# Patient Record
Sex: MALE | Race: WHITE | NOT HISPANIC OR LATINO | Employment: FULL TIME | URBAN - METROPOLITAN AREA
[De-identification: names, ages, dates, MRNs, and addresses within clinical notes are randomized per-mention and may not be internally consistent; named-entity substitution may affect disease eponyms.]

---

## 2022-07-15 ENCOUNTER — OFFICE VISIT (OUTPATIENT)
Dept: FAMILY MEDICINE | Facility: CLINIC | Age: 37
End: 2022-07-15
Payer: MEDICAID

## 2022-07-15 ENCOUNTER — LAB VISIT (OUTPATIENT)
Dept: LAB | Facility: HOSPITAL | Age: 37
End: 2022-07-15
Attending: NURSE PRACTITIONER
Payer: MEDICAID

## 2022-07-15 VITALS
HEART RATE: 73 BPM | OXYGEN SATURATION: 98 % | SYSTOLIC BLOOD PRESSURE: 126 MMHG | WEIGHT: 224.31 LBS | RESPIRATION RATE: 20 BRPM | DIASTOLIC BLOOD PRESSURE: 88 MMHG | BODY MASS INDEX: 29.73 KG/M2 | HEIGHT: 73 IN

## 2022-07-15 DIAGNOSIS — R53.83 FATIGUE, UNSPECIFIED TYPE: ICD-10-CM

## 2022-07-15 DIAGNOSIS — F41.9 ANXIETY AND DEPRESSION: ICD-10-CM

## 2022-07-15 DIAGNOSIS — Z11.4 SCREENING FOR HIV WITHOUT PRESENCE OF RISK FACTORS: ICD-10-CM

## 2022-07-15 DIAGNOSIS — K21.9 GASTROESOPHAGEAL REFLUX DISEASE, UNSPECIFIED WHETHER ESOPHAGITIS PRESENT: ICD-10-CM

## 2022-07-15 DIAGNOSIS — F32.A ANXIETY AND DEPRESSION: ICD-10-CM

## 2022-07-15 DIAGNOSIS — Z13.220 SCREENING CHOLESTEROL LEVEL: ICD-10-CM

## 2022-07-15 DIAGNOSIS — Z00.00 ROUTINE HEALTH MAINTENANCE: ICD-10-CM

## 2022-07-15 DIAGNOSIS — Z86.69 HISTORY OF OBSTRUCTIVE SLEEP APNEA: ICD-10-CM

## 2022-07-15 DIAGNOSIS — Z00.00 ROUTINE HEALTH MAINTENANCE: Primary | ICD-10-CM

## 2022-07-15 LAB
ALBUMIN SERPL BCP-MCNC: 4.6 G/DL (ref 3.5–5.2)
ALP SERPL-CCNC: 59 U/L (ref 55–135)
ALT SERPL W/O P-5'-P-CCNC: 41 U/L (ref 10–44)
ANION GAP SERPL CALC-SCNC: 11 MMOL/L (ref 8–16)
AST SERPL-CCNC: 24 U/L (ref 10–40)
BASOPHILS # BLD AUTO: 0.06 K/UL (ref 0–0.2)
BASOPHILS NFR BLD: 0.8 % (ref 0–1.9)
BILIRUB SERPL-MCNC: 0.4 MG/DL (ref 0.1–1)
BILIRUB UR QL STRIP: NEGATIVE
BUN SERPL-MCNC: 12 MG/DL (ref 6–20)
CALCIUM SERPL-MCNC: 9.7 MG/DL (ref 8.7–10.5)
CHLORIDE SERPL-SCNC: 101 MMOL/L (ref 95–110)
CHOLEST SERPL-MCNC: 263 MG/DL (ref 120–199)
CHOLEST/HDLC SERPL: 7.7 {RATIO} (ref 2–5)
CLARITY UR: CLEAR
CO2 SERPL-SCNC: 24 MMOL/L (ref 23–29)
COLOR UR: YELLOW
CREAT SERPL-MCNC: 0.9 MG/DL (ref 0.5–1.4)
DIFFERENTIAL METHOD: ABNORMAL
EOSINOPHIL # BLD AUTO: 0.3 K/UL (ref 0–0.5)
EOSINOPHIL NFR BLD: 3.9 % (ref 0–8)
ERYTHROCYTE [DISTWIDTH] IN BLOOD BY AUTOMATED COUNT: 12.8 % (ref 11.5–14.5)
EST. GFR  (AFRICAN AMERICAN): >60 ML/MIN/1.73 M^2
EST. GFR  (NON AFRICAN AMERICAN): >60 ML/MIN/1.73 M^2
GLUCOSE SERPL-MCNC: 104 MG/DL (ref 70–110)
GLUCOSE UR QL STRIP: NEGATIVE
HCT VFR BLD AUTO: 45.4 % (ref 40–54)
HDLC SERPL-MCNC: 34 MG/DL (ref 40–75)
HDLC SERPL: 12.9 % (ref 20–50)
HGB BLD-MCNC: 15.4 G/DL (ref 14–18)
HGB UR QL STRIP: NEGATIVE
IMM GRANULOCYTES # BLD AUTO: 0.01 K/UL (ref 0–0.04)
IMM GRANULOCYTES NFR BLD AUTO: 0.1 % (ref 0–0.5)
KETONES UR QL STRIP: NEGATIVE
LDLC SERPL CALC-MCNC: ABNORMAL MG/DL (ref 63–159)
LEUKOCYTE ESTERASE UR QL STRIP: NEGATIVE
LYMPHOCYTES # BLD AUTO: 2.7 K/UL (ref 1–4.8)
LYMPHOCYTES NFR BLD: 36.5 % (ref 18–48)
MCH RBC QN AUTO: 29.3 PG (ref 27–31)
MCHC RBC AUTO-ENTMCNC: 33.9 G/DL (ref 32–36)
MCV RBC AUTO: 87 FL (ref 82–98)
MONOCYTES # BLD AUTO: 0.9 K/UL (ref 0.3–1)
MONOCYTES NFR BLD: 11.4 % (ref 4–15)
NEUTROPHILS # BLD AUTO: 3.5 K/UL (ref 1.8–7.7)
NEUTROPHILS NFR BLD: 47.3 % (ref 38–73)
NITRITE UR QL STRIP: NEGATIVE
NONHDLC SERPL-MCNC: 229 MG/DL
NRBC BLD-RTO: 0 /100 WBC
PH UR STRIP: 5 [PH] (ref 5–8)
PLATELET # BLD AUTO: 261 K/UL (ref 150–450)
PMV BLD AUTO: 9 FL (ref 9.2–12.9)
POTASSIUM SERPL-SCNC: 3.8 MMOL/L (ref 3.5–5.1)
PROT SERPL-MCNC: 8.1 G/DL (ref 6–8.4)
PROT UR QL STRIP: NEGATIVE
RBC # BLD AUTO: 5.25 M/UL (ref 4.6–6.2)
SODIUM SERPL-SCNC: 136 MMOL/L (ref 136–145)
SP GR UR STRIP: 1.02 (ref 1–1.03)
TRIGL SERPL-MCNC: 792 MG/DL (ref 30–150)
TSH SERPL DL<=0.005 MIU/L-ACNC: 2.57 UIU/ML (ref 0.34–5.6)
URN SPEC COLLECT METH UR: NORMAL
UROBILINOGEN UR STRIP-ACNC: NEGATIVE EU/DL
WBC # BLD AUTO: 7.47 K/UL (ref 3.9–12.7)

## 2022-07-15 PROCEDURE — 3008F PR BODY MASS INDEX (BMI) DOCUMENTED: ICD-10-PCS | Mod: CPTII,,, | Performed by: NURSE PRACTITIONER

## 2022-07-15 PROCEDURE — 99204 PR OFFICE/OUTPT VISIT, NEW, LEVL IV, 45-59 MIN: ICD-10-PCS | Mod: S$PBB,,, | Performed by: NURSE PRACTITIONER

## 2022-07-15 PROCEDURE — 85025 COMPLETE CBC W/AUTO DIFF WBC: CPT | Performed by: NURSE PRACTITIONER

## 2022-07-15 PROCEDURE — 84443 ASSAY THYROID STIM HORMONE: CPT | Performed by: NURSE PRACTITIONER

## 2022-07-15 PROCEDURE — 36415 COLL VENOUS BLD VENIPUNCTURE: CPT | Performed by: NURSE PRACTITIONER

## 2022-07-15 PROCEDURE — 80061 LIPID PANEL: CPT | Performed by: NURSE PRACTITIONER

## 2022-07-15 PROCEDURE — 99205 OFFICE O/P NEW HI 60 MIN: CPT | Performed by: NURSE PRACTITIONER

## 2022-07-15 PROCEDURE — 1159F MED LIST DOCD IN RCRD: CPT | Mod: CPTII,,, | Performed by: NURSE PRACTITIONER

## 2022-07-15 PROCEDURE — 99204 OFFICE O/P NEW MOD 45 MIN: CPT | Mod: S$PBB,,, | Performed by: NURSE PRACTITIONER

## 2022-07-15 PROCEDURE — 1159F PR MEDICATION LIST DOCUMENTED IN MEDICAL RECORD: ICD-10-PCS | Mod: CPTII,,, | Performed by: NURSE PRACTITIONER

## 2022-07-15 PROCEDURE — 3008F BODY MASS INDEX DOCD: CPT | Mod: CPTII,,, | Performed by: NURSE PRACTITIONER

## 2022-07-15 PROCEDURE — 87389 HIV-1 AG W/HIV-1&-2 AB AG IA: CPT | Performed by: NURSE PRACTITIONER

## 2022-07-15 PROCEDURE — 80053 COMPREHEN METABOLIC PANEL: CPT | Performed by: NURSE PRACTITIONER

## 2022-07-15 PROCEDURE — 81003 URINALYSIS AUTO W/O SCOPE: CPT | Performed by: NURSE PRACTITIONER

## 2022-07-15 RX ORDER — FLUOXETINE HYDROCHLORIDE 20 MG/1
20 CAPSULE ORAL DAILY
Qty: 30 CAPSULE | Refills: 1 | Status: SHIPPED | OUTPATIENT
Start: 2022-07-15 | End: 2022-08-08

## 2022-07-15 NOTE — PROGRESS NOTES
SUBJECTIVE:    Patient ID: Tamiko Maher is a 37 y.o. male.    Chief Complaint: Establish Care, GI Problem, and Anxiety    Mr. Maher is a very pleasant 36 YO male here to establish care with me as his PCP. He states he has not been seen by Primary Care provider since 2016. He has a host of issues that he would like to be addressed at some point but we will prioritize the ones of greatest importance to be addressed today. He states for several years he has been experiencing issues with GERD. He states he has issues with reflux so often he has to take 2-3 Tums daily with minimal relief. He also states he has what he considers to be spasms of diaphragm 3 times per week where he sometimes feels it is difficult to breathe. Lastly, he also states he has diarrhea when he is stressed and would like to be evaluated for Irritable bowel syndrome. He has never been diagnosed with this but feels that it is something that needs to be evaluated.     He also states he has had some issues as of late regarding anxiety and depression. He recently had to move back in with his parents and has had some shifts in jobs. He states this has been having more panic attacks as of late and has never been evaluated by mental health professional. He would like referral at this time.     Lastly, he states he has not been getting restful sleep and does snore and has apneic episodes often. He has never been evaluated for sleep apnea and is requesting referral be placed as well due to his strong family history of this.     GI Problem  The primary symptoms include fatigue, nausea and diarrhea. Primary symptoms do not include fever, abdominal pain, vomiting, myalgias, arthralgias or rash. The illness began more than 7 days ago. The onset was gradual.   The fatigue began more than 1 week ago. The fatigue has been worsening since its onset. The fatigue is worsened by nothing.   Nausea began more than 1 week ago. The nausea is associated with  eating. The nausea is exacerbated by food.   The diarrhea began more than 1 week ago. The diarrhea is watery. The diarrhea occurs 2 to 4 times per day.   The illness is also significant for dysphagia and back pain. The illness does not include constipation. Significant associated medical issues include inflammatory bowel disease.   Anxiety  Presents for initial visit. Onset was 1 to 5 years ago. The problem has been gradually worsening. Symptoms include depressed mood, excessive worry, feeling of choking, malaise, nausea, nervous/anxious behavior, panic and restlessness. Patient reports no chest pain, confusion, decreased concentration, dizziness, palpitations, shortness of breath or suicidal ideas. Symptoms occur most days. The severity of symptoms is interfering with daily activities. Nothing aggravates the symptoms. The quality of sleep is poor. Nighttime awakenings: several.     Risk factors include family history and a major life event. His past medical history is significant for anxiety/panic attacks. Past treatments include nothing.     The patient has several other issues that he would like addressed. He will return for follow up at a later date.    Overdue health maintenance discussed.     No visits with results within 6 Month(s) from this visit.   Latest known visit with results is:   Lab Visit on 07/26/2016   Component Date Value Ref Range Status    WBC 07/26/2016 7.30  3.90 - 12.70 K/uL Final    RBC 07/26/2016 5.01  4.60 - 6.20 M/uL Final    Hemoglobin 07/26/2016 15.1  14.0 - 18.0 g/dL Final    Hematocrit 07/26/2016 43.7  40.0 - 54.0 % Final    MCV 07/26/2016 87  82 - 98 fL Final    MCH 07/26/2016 30.0  27.0 - 31.0 pg Final    MCHC 07/26/2016 34.5  32.0 - 36.0 % Final    RDW 07/26/2016 12.6  11.5 - 14.5 % Final    Platelets 07/26/2016 247  150 - 350 K/uL Final    MPV 07/26/2016 7.4 (A) 9.2 - 12.9 fL Final    Gran # (ANC) 07/26/2016 3.6  1.8 - 7.7 K/uL Final    Lymph # 07/26/2016 2.4  1.0 -  4.8 K/uL Final    Mono # 07/26/2016 0.8  0.3 - 1.0 K/uL Final    Eos # 07/26/2016 0.3  0.0 - 0.5 K/uL Final    Baso # 07/26/2016 0.00  0.00 - 0.20 K/uL Final    Gran % 07/26/2016 50.1  38.0 - 73.0 % Final    Lymph % 07/26/2016 33.7  18.0 - 48.0 % Final    Mono % 07/26/2016 11.3  4.0 - 15.0 % Final    Eosinophil % 07/26/2016 4.4  0.0 - 8.0 % Final    Basophil % 07/26/2016 0.5  0.0 - 1.9 % Final    Differential Method 07/26/2016 Automated   Final    Sodium 07/26/2016 140  136 - 145 mmol/L Final    Potassium 07/26/2016 3.9  3.5 - 5.1 mmol/L Final    Chloride 07/26/2016 106  95 - 110 mmol/L Final    CO2 07/26/2016 24  23 - 29 mmol/L Final    Glucose 07/26/2016 106  70 - 110 mg/dL Final    BUN 07/26/2016 8  6 - 20 mg/dL Final    Creatinine 07/26/2016 0.9  0.5 - 1.4 mg/dL Final    Calcium 07/26/2016 9.3  8.7 - 10.5 mg/dL Final    Total Protein 07/26/2016 7.2  6.0 - 8.4 g/dL Final    Albumin 07/26/2016 4.2  3.5 - 5.2 g/dL Final    Total Bilirubin 07/26/2016 0.2  0.1 - 1.0 mg/dL Final    Alkaline Phosphatase 07/26/2016 62  55 - 135 U/L Final    AST 07/26/2016 23  10 - 40 U/L Final    ALT 07/26/2016 39  10 - 44 U/L Final    Anion Gap 07/26/2016 10  8 - 16 mmol/L Final    eGFR if  07/26/2016 >60  >60 mL/min/1.73 m^2 Final    eGFR if non African American 07/26/2016 >60  >60 mL/min/1.73 m^2 Final    Amylase 07/26/2016 59  20 - 110 U/L Final    Lipase 07/26/2016 142 (A) 4 - 60 U/L Final    H Pylori IgG Interp 07/26/2016 Negative  Negative Final    IgA 07/26/2016 191  40 - 350 mg/dL Final    TTG IgA 07/26/2016 4  <20 UNITS Final       Past Medical History:   Diagnosis Date    Elevated ALT measurement     Fatty liver     GERD (gastroesophageal reflux disease)     Sleep apnea      Social History     Socioeconomic History    Marital status: Single   Tobacco Use    Smoking status: Former Smoker     Packs/day: 0.25     Years: 4.00     Pack years: 1.00     Types: Cigarettes     Start  date: 2012     Quit date: 2016     Years since quittin.0    Smokeless tobacco: Never Used     Past Surgical History:   Procedure Laterality Date    COLONOSCOPY  ~    COLONOSCOPY N/A 2016    Procedure: COLONOSCOPY;  Surgeon: Chris Bautista MD;  Location: [facility];  Service: Endoscopy;  Laterality: N/A;    UPPER GASTROINTESTINAL ENDOSCOPY  2010    LA grade c esophagitis, otherwise normal findings; biopsy: mild chronic gastritis, h pylori negative, acute and chronic esophagitits with focal ulcerations, no dysplasia or malignancy    WISDOM TOOTH EXTRACTION       Family History   Problem Relation Age of Onset    Colon polyps Mother     Colon cancer Neg Hx     Crohn's disease Neg Hx     Ulcerative colitis Neg Hx        Review of patient's allergies indicates:   Allergen Reactions    Grass pollen- grass standard Other (See Comments)       Current Outpatient Medications:     FLUoxetine 20 MG capsule, Take 1 capsule (20 mg total) by mouth once daily., Disp: 30 capsule, Rfl: 1    Review of Systems   Constitutional: Positive for fatigue. Negative for activity change, appetite change, fever and unexpected weight change.   HENT: Negative for congestion, dental problem, hearing loss, nosebleeds, postnasal drip, rhinorrhea, sinus pain, sore throat, tinnitus and trouble swallowing.    Eyes: Negative for pain, discharge, itching and visual disturbance.   Respiratory: Negative for cough, chest tightness, shortness of breath and wheezing.    Cardiovascular: Negative for chest pain and palpitations.   Gastrointestinal: Positive for diarrhea, dysphagia and nausea. Negative for abdominal pain, blood in stool, constipation and vomiting.   Endocrine: Negative for cold intolerance, heat intolerance, polydipsia, polyphagia and polyuria.   Genitourinary: Negative for difficulty urinating, flank pain, genital sores, hematuria and urgency.   Musculoskeletal: Positive for back pain and neck pain.  "Negative for arthralgias, joint swelling and myalgias.   Skin: Negative for rash and wound.   Allergic/Immunologic: Negative for environmental allergies and food allergies.   Neurological: Positive for headaches. Negative for dizziness, weakness and light-headedness.   Hematological: Negative for adenopathy. Does not bruise/bleed easily.   Psychiatric/Behavioral: Negative for behavioral problems, confusion, decreased concentration, dysphoric mood, sleep disturbance and suicidal ideas. The patient is nervous/anxious. The patient is not hyperactive.           Objective:      Vitals:    07/15/22 0902   BP: 126/88   Pulse: 73   Resp: 20   SpO2: 98%   Weight: 101.7 kg (224 lb 4.8 oz)   Height: 6' 1" (1.854 m)     Physical Exam  Vitals reviewed.   Constitutional:       General: He is not in acute distress.     Appearance: Normal appearance. He is normal weight. He is not ill-appearing, toxic-appearing or diaphoretic.   HENT:      Head: Normocephalic and atraumatic.      Right Ear: Tympanic membrane and external ear normal. There is no impacted cerumen.      Left Ear: Tympanic membrane and external ear normal. There is no impacted cerumen.      Nose: Nose normal. No congestion or rhinorrhea.      Mouth/Throat:      Mouth: Mucous membranes are moist.      Pharynx: Oropharynx is clear. No oropharyngeal exudate or posterior oropharyngeal erythema.   Eyes:      General: No scleral icterus.        Right eye: No discharge.         Left eye: No discharge.      Extraocular Movements: Extraocular movements intact.      Conjunctiva/sclera: Conjunctivae normal.      Pupils: Pupils are equal, round, and reactive to light.   Cardiovascular:      Rate and Rhythm: Normal rate and regular rhythm.      Pulses: Normal pulses.      Heart sounds: Normal heart sounds. No murmur heard.    No friction rub. No gallop.   Pulmonary:      Effort: Pulmonary effort is normal. No respiratory distress.      Breath sounds: Normal breath sounds. No " wheezing, rhonchi or rales.   Chest:      Chest wall: No tenderness.   Abdominal:      General: Abdomen is flat. Bowel sounds are normal.      Palpations: Abdomen is soft. There is no mass.      Tenderness: There is no abdominal tenderness. There is no guarding or rebound.   Musculoskeletal:         General: No swelling or signs of injury. Normal range of motion.      Cervical back: Normal range of motion and neck supple. No rigidity or tenderness.      Right lower leg: No edema.      Left lower leg: No edema.   Skin:     General: Skin is warm and dry.      Capillary Refill: Capillary refill takes less than 2 seconds.      Coloration: Skin is not pale.      Findings: No bruising or erythema.   Neurological:      General: No focal deficit present.      Mental Status: He is alert and oriented to person, place, and time. Mental status is at baseline.      Motor: No weakness.      Coordination: Coordination normal.      Gait: Gait normal.   Psychiatric:         Mood and Affect: Mood normal.         Behavior: Behavior normal.         Thought Content: Thought content normal.         Judgment: Judgment normal.           Assessment:       1. Routine health maintenance    2. History of obstructive sleep apnea    3. Anxiety and depression    4. Gastroesophageal reflux disease, unspecified whether esophagitis present    5. Fatigue, unspecified type    6. Screening cholesterol level    7. Screening for HIV without presence of risk factors         Plan:       Routine health maintenance  -     Lipid Panel; Future; Expected date: 07/15/2022  -     Comprehensive Metabolic Panel; Future; Expected date: 07/15/2022  -     CBC auto differential; Future; Expected date: 07/15/2022  -     TSH; Future; Expected date: 07/15/2022  -     Urinalysis; Future; Expected date: 07/15/2022    History of obstructive sleep apnea  -     Ambulatory referral/consult to Pulmonology; Future; Expected date: 07/22/2022    Anxiety and depression  -      Ambulatory referral/consult to Psychiatry; Future; Expected date: 07/22/2022  -     FLUoxetine 20 MG capsule; Take 1 capsule (20 mg total) by mouth once daily.  Dispense: 30 capsule; Refill: 1    Gastroesophageal reflux disease, unspecified whether esophagitis present  -     CBC auto differential; Future; Expected date: 07/15/2022  -     Ambulatory referral/consult to Gastroenterology; Future; Expected date: 07/21/2022    Fatigue, unspecified type  -     TSH; Future; Expected date: 07/15/2022    Screening cholesterol level  -     Lipid Panel; Future; Expected date: 07/15/2022    Screening for HIV without presence of risk factors  -     HIV 1/2 Ag/Ab (4th Gen); Future; Expected date: 07/15/2022      Follow up in about 4 weeks (around 8/12/2022), or if symptoms worsen or fail to improve, for Lab review/Back pain.        7/15/2022 STEVAN Miranda, FNP-C

## 2022-07-16 LAB — HIV 1+2 AB+HIV1 P24 AG SERPL QL IA: NON REACTIVE

## 2022-07-18 ENCOUNTER — TELEPHONE (OUTPATIENT)
Dept: FAMILY MEDICINE | Facility: CLINIC | Age: 37
End: 2022-07-18

## 2022-07-18 NOTE — TELEPHONE ENCOUNTER
----- Message from BELLO Renae-OSCAR sent at 7/18/2022 10:23 AM CDT -----  Mr. Maher your lipid panel  is extremely elevated and the best course of action at this time would be to start a statin medication daily to lower cholesterol level. I would also like to suggest lifestyle changes such as a healthier, heart healthy diet. The medication would be taken once daily but it is imperative to take to lower the risk of heart attack or stroke due to elevated cholesterol levels.    The rest of your labs were within normal range. Please let me know if you have any questions. I will send medication once I know he is amendable to receive treatment.

## 2022-07-18 NOTE — PROGRESS NOTES
Mr. Maher your lipid panel  is extremely elevated and the best course of action at this time would be to start a statin medication daily to lower cholesterol level. I would also like to suggest lifestyle changes such as a healthier, heart healthy diet. The medication would be taken once daily but it is imperative to take to lower the risk of heart attack or stroke due to elevated cholesterol levels.    The rest of your labs were within normal range. Please let me know if you have any questions. I will send medication once I know he is amendable to receive treatment.

## 2022-07-19 ENCOUNTER — PATIENT MESSAGE (OUTPATIENT)
Dept: FAMILY MEDICINE | Facility: CLINIC | Age: 37
End: 2022-07-19

## 2022-07-19 NOTE — TELEPHONE ENCOUNTER
Patient returned nurse call from earlier. He stated he works at night so it's hard for him to receive calls during the morning due to him being sleep. I tried contacting patient again to give lab results. LVM for patient to sign up for [facility] account

## 2022-07-23 ENCOUNTER — PATIENT MESSAGE (OUTPATIENT)
Dept: FAMILY MEDICINE | Facility: CLINIC | Age: 37
End: 2022-07-23

## 2022-07-29 DIAGNOSIS — E78.2 MIXED HYPERLIPIDEMIA: Primary | ICD-10-CM

## 2022-07-29 RX ORDER — ATORVASTATIN CALCIUM 40 MG/1
40 TABLET, FILM COATED ORAL DAILY
Qty: 90 TABLET | Refills: 3 | Status: SHIPPED | OUTPATIENT
Start: 2022-07-29 | End: 2023-07-29

## 2022-08-11 RX ORDER — AMOXICILLIN AND CLAVULANATE POTASSIUM 875; 125 MG/1; MG/1
1 TABLET, FILM COATED ORAL 2 TIMES DAILY
COMMUNITY
Start: 2022-08-04 | End: 2022-08-19

## 2022-08-19 ENCOUNTER — OFFICE VISIT (OUTPATIENT)
Dept: FAMILY MEDICINE | Facility: CLINIC | Age: 37
End: 2022-08-19
Payer: MEDICAID

## 2022-08-19 VITALS
SYSTOLIC BLOOD PRESSURE: 118 MMHG | RESPIRATION RATE: 16 BRPM | HEIGHT: 73 IN | BODY MASS INDEX: 29.22 KG/M2 | OXYGEN SATURATION: 98 % | TEMPERATURE: 98 F | WEIGHT: 220.5 LBS | DIASTOLIC BLOOD PRESSURE: 74 MMHG | HEART RATE: 87 BPM

## 2022-08-19 DIAGNOSIS — F41.9 ANXIETY AND DEPRESSION: ICD-10-CM

## 2022-08-19 DIAGNOSIS — E78.2 MIXED HYPERLIPIDEMIA: ICD-10-CM

## 2022-08-19 DIAGNOSIS — F32.A ANXIETY AND DEPRESSION: ICD-10-CM

## 2022-08-19 DIAGNOSIS — K21.9 GASTROESOPHAGEAL REFLUX DISEASE, UNSPECIFIED WHETHER ESOPHAGITIS PRESENT: Primary | ICD-10-CM

## 2022-08-19 PROCEDURE — 1159F MED LIST DOCD IN RCRD: CPT | Mod: CPTII,,, | Performed by: NURSE PRACTITIONER

## 2022-08-19 PROCEDURE — 99213 PR OFFICE/OUTPT VISIT, EST, LEVL III, 20-29 MIN: ICD-10-PCS | Mod: S$PBB,,, | Performed by: NURSE PRACTITIONER

## 2022-08-19 PROCEDURE — 3074F SYST BP LT 130 MM HG: CPT | Mod: CPTII,,, | Performed by: NURSE PRACTITIONER

## 2022-08-19 PROCEDURE — 99215 OFFICE O/P EST HI 40 MIN: CPT | Performed by: NURSE PRACTITIONER

## 2022-08-19 PROCEDURE — 3008F BODY MASS INDEX DOCD: CPT | Mod: CPTII,,, | Performed by: NURSE PRACTITIONER

## 2022-08-19 PROCEDURE — 3008F PR BODY MASS INDEX (BMI) DOCUMENTED: ICD-10-PCS | Mod: CPTII,,, | Performed by: NURSE PRACTITIONER

## 2022-08-19 PROCEDURE — 99213 OFFICE O/P EST LOW 20 MIN: CPT | Mod: S$PBB,,, | Performed by: NURSE PRACTITIONER

## 2022-08-19 PROCEDURE — 3078F DIAST BP <80 MM HG: CPT | Mod: CPTII,,, | Performed by: NURSE PRACTITIONER

## 2022-08-19 PROCEDURE — 3078F PR MOST RECENT DIASTOLIC BLOOD PRESSURE < 80 MM HG: ICD-10-PCS | Mod: CPTII,,, | Performed by: NURSE PRACTITIONER

## 2022-08-19 PROCEDURE — 3074F PR MOST RECENT SYSTOLIC BLOOD PRESSURE < 130 MM HG: ICD-10-PCS | Mod: CPTII,,, | Performed by: NURSE PRACTITIONER

## 2022-08-19 PROCEDURE — 1159F PR MEDICATION LIST DOCUMENTED IN MEDICAL RECORD: ICD-10-PCS | Mod: CPTII,,, | Performed by: NURSE PRACTITIONER

## 2022-08-19 NOTE — PROGRESS NOTES
Patient ID: Tamiko Maher is a 37 y.o. male.    Chief Complaint: Follow-up (One month lab review.)    Mr. Maher presents today for follow up. He is doing well at this time and is currently recovering from Covid 19 infection he was diagnosed with 2 weeks ago. He states he finally feels his symptoms are resolving but felt he experienced symptoms longer than average person due to his elevated lipids. His labs were reviewed in great detail during this visit and he was started on medication to lower. He is requesting referrals be resent due to no one reaching out to him. He denies any other issues or complaints.     Past Medical History:   Diagnosis Date    Elevated ALT measurement     Fatty liver     GERD (gastroesophageal reflux disease)     Hyperlipidemia     Sleep apnea      Past Surgical History:   Procedure Laterality Date    COLONOSCOPY  ~2001    COLONOSCOPY N/A 9/1/2016    Procedure: COLONOSCOPY;  Surgeon: Chris Bautista MD;  Location: [facility];  Service: Endoscopy;  Laterality: N/A;    UPPER GASTROINTESTINAL ENDOSCOPY  05/12/2010    LA grade c esophagitis, otherwise normal findings; biopsy: mild chronic gastritis, h pylori negative, acute and chronic esophagitits with focal ulcerations, no dysplasia or malignancy    WISDOM TOOTH EXTRACTION           Tobacco History:  reports that he quit smoking about 6 years ago. His smoking use included cigarettes. He started smoking about 10 years ago. He has never used smokeless tobacco.      Review of patient's allergies indicates:   Allergen Reactions    Grass pollen-luis daniel grass standard Other (See Comments)       Current Outpatient Medications:     atorvastatin (LIPITOR) 40 MG tablet, Take 1 tablet (40 mg total) by mouth once daily., Disp: 90 tablet, Rfl: 3    FLUoxetine 20 MG capsule, TAKE 1 CAPSULE BY MOUTH EVERY DAY, Disp: 30 capsule, Rfl: 1    Review of Systems   Constitutional: Negative for activity change, appetite change, fatigue, fever and  "unexpected weight change.   HENT: Negative for congestion, dental problem, hearing loss, nosebleeds, postnasal drip, rhinorrhea, sinus pain, sore throat, tinnitus and trouble swallowing.    Eyes: Negative for pain, discharge, itching and visual disturbance.   Respiratory: Negative for cough, chest tightness, shortness of breath and wheezing.    Cardiovascular: Negative for chest pain and palpitations.   Gastrointestinal: Negative for abdominal pain, blood in stool, constipation, diarrhea, nausea and vomiting.   Endocrine: Negative for cold intolerance, heat intolerance, polydipsia, polyphagia and polyuria.   Genitourinary: Negative for difficulty urinating, flank pain, genital sores, hematuria and urgency.   Musculoskeletal: Negative for arthralgias, joint swelling, myalgias and neck pain.   Skin: Negative for rash and wound.   Allergic/Immunologic: Negative for environmental allergies and food allergies.   Neurological: Negative for dizziness, weakness, light-headedness and headaches.   Hematological: Negative for adenopathy. Does not bruise/bleed easily.   Psychiatric/Behavioral: Negative for behavioral problems, confusion, decreased concentration, dysphoric mood, sleep disturbance and suicidal ideas. The patient is not nervous/anxious and is not hyperactive.           Objective:      Vitals:    08/19/22 1059   BP: 118/74   Pulse: 87   Resp: 16   Temp: 98.4 °F (36.9 °C)   TempSrc: Oral   SpO2: 98%   Weight: 100 kg (220 lb 8 oz)   Height: 6' 1" (1.854 m)     Physical Exam  Vitals reviewed.   Constitutional:       General: He is not in acute distress.     Appearance: Normal appearance. He is normal weight. He is not ill-appearing, toxic-appearing or diaphoretic.   HENT:      Head: Normocephalic and atraumatic.      Right Ear: Tympanic membrane and external ear normal. There is no impacted cerumen.      Left Ear: Tympanic membrane and external ear normal. There is no impacted cerumen.      Nose: Nose normal. No " congestion or rhinorrhea.      Mouth/Throat:      Mouth: Mucous membranes are moist.      Pharynx: Oropharynx is clear. No oropharyngeal exudate or posterior oropharyngeal erythema.   Eyes:      General: No scleral icterus.        Right eye: No discharge.         Left eye: No discharge.      Extraocular Movements: Extraocular movements intact.      Conjunctiva/sclera: Conjunctivae normal.      Pupils: Pupils are equal, round, and reactive to light.   Cardiovascular:      Rate and Rhythm: Normal rate and regular rhythm.      Pulses: Normal pulses.      Heart sounds: Normal heart sounds. No murmur heard.    No friction rub. No gallop.   Pulmonary:      Effort: Pulmonary effort is normal. No respiratory distress.      Breath sounds: Normal breath sounds. No wheezing, rhonchi or rales.   Chest:      Chest wall: No tenderness.   Abdominal:      General: Abdomen is flat. Bowel sounds are normal.      Palpations: Abdomen is soft. There is no mass.      Tenderness: There is no abdominal tenderness. There is no guarding or rebound.   Musculoskeletal:         General: No swelling or signs of injury. Normal range of motion.      Cervical back: Normal range of motion and neck supple. No rigidity or tenderness.      Right lower leg: No edema.      Left lower leg: No edema.   Skin:     General: Skin is warm and dry.      Capillary Refill: Capillary refill takes less than 2 seconds.      Coloration: Skin is not pale.      Findings: No bruising or erythema.   Neurological:      General: No focal deficit present.      Mental Status: He is alert and oriented to person, place, and time. Mental status is at baseline.      Motor: No weakness.      Coordination: Coordination normal.      Gait: Gait normal.   Psychiatric:         Mood and Affect: Mood normal.         Behavior: Behavior normal.         Thought Content: Thought content normal.         Judgment: Judgment normal.           Assessment:       1. Gastroesophageal reflux disease,  unspecified whether esophagitis present    2. Anxiety and depression    3. Mixed hyperlipidemia           Plan:       Gastroesophageal reflux disease, unspecified whether esophagitis present  -     Ambulatory referral/consult to Gastroenterology; Future; Expected date: 08/26/2022    Anxiety and depression  -     Ambulatory referral/consult to Psychiatry; Future; Expected date: 08/26/2022    Mixed hyperlipidemia  -     Lipid Panel; Future; Expected date: 01/19/2023      Follow up in about 5 months (around 1/19/2023), or if symptoms worsen or fail to improve, for Hyperlipidemia/Hypertension .        8/19/2022 Holley Wyatt, NP

## 2022-09-27 ENCOUNTER — OFFICE VISIT (OUTPATIENT)
Dept: PULMONOLOGY | Facility: CLINIC | Age: 37
End: 2022-09-27
Payer: MEDICAID

## 2022-09-27 VITALS
DIASTOLIC BLOOD PRESSURE: 80 MMHG | BODY MASS INDEX: 29.26 KG/M2 | HEART RATE: 66 BPM | OXYGEN SATURATION: 98 % | SYSTOLIC BLOOD PRESSURE: 130 MMHG | WEIGHT: 221.81 LBS

## 2022-09-27 DIAGNOSIS — R09.81 NASAL CONGESTION: ICD-10-CM

## 2022-09-27 DIAGNOSIS — G47.33 OBSTRUCTIVE SLEEP APNEA: ICD-10-CM

## 2022-09-27 PROCEDURE — 1159F PR MEDICATION LIST DOCUMENTED IN MEDICAL RECORD: ICD-10-PCS | Mod: CPTII,S$GLB,, | Performed by: INTERNAL MEDICINE

## 2022-09-27 PROCEDURE — 3008F PR BODY MASS INDEX (BMI) DOCUMENTED: ICD-10-PCS | Mod: CPTII,S$GLB,, | Performed by: INTERNAL MEDICINE

## 2022-09-27 PROCEDURE — 3079F DIAST BP 80-89 MM HG: CPT | Mod: CPTII,S$GLB,, | Performed by: INTERNAL MEDICINE

## 2022-09-27 PROCEDURE — 99204 OFFICE O/P NEW MOD 45 MIN: CPT | Mod: S$GLB,,, | Performed by: INTERNAL MEDICINE

## 2022-09-27 PROCEDURE — 3008F BODY MASS INDEX DOCD: CPT | Mod: CPTII,S$GLB,, | Performed by: INTERNAL MEDICINE

## 2022-09-27 PROCEDURE — 3075F SYST BP GE 130 - 139MM HG: CPT | Mod: CPTII,S$GLB,, | Performed by: INTERNAL MEDICINE

## 2022-09-27 PROCEDURE — 99204 PR OFFICE/OUTPT VISIT, NEW, LEVL IV, 45-59 MIN: ICD-10-PCS | Mod: S$GLB,,, | Performed by: INTERNAL MEDICINE

## 2022-09-27 PROCEDURE — 3079F PR MOST RECENT DIASTOLIC BLOOD PRESSURE 80-89 MM HG: ICD-10-PCS | Mod: CPTII,S$GLB,, | Performed by: INTERNAL MEDICINE

## 2022-09-27 PROCEDURE — 1159F MED LIST DOCD IN RCRD: CPT | Mod: CPTII,S$GLB,, | Performed by: INTERNAL MEDICINE

## 2022-09-27 PROCEDURE — 3075F PR MOST RECENT SYSTOLIC BLOOD PRESS GE 130-139MM HG: ICD-10-PCS | Mod: CPTII,S$GLB,, | Performed by: INTERNAL MEDICINE

## 2022-09-27 NOTE — PROGRESS NOTES
New Office Visit/Consultation Note *    Patient Name: Tamiko Maher  MRN: [id number]  : 1985      Reason for visit: Sleep apnea    HPI:     2022 - Pt referred for evaluation of sleep apnea.  + snoring, + EDS (ESS 16).  Stop-BANG - 6.    Past Medical History    Past Medical History:   Diagnosis Date    Elevated ALT measurement     Fatty liver     GERD (gastroesophageal reflux disease)     Hyperlipidemia     Sleep apnea        Past Surgical History    Past Surgical History:   Procedure Laterality Date    COLONOSCOPY  ~    COLONOSCOPY N/A 2016    Procedure: COLONOSCOPY;  Surgeon: Chris Bautista MD;  Location: [facility];  Service: Endoscopy;  Laterality: N/A;    UPPER GASTROINTESTINAL ENDOSCOPY  2010    LA grade c esophagitis, otherwise normal findings; biopsy: mild chronic gastritis, h pylori negative, acute and chronic esophagitits with focal ulcerations, no dysplasia or malignancy    WISDOM TOOTH EXTRACTION         Medications      Current Outpatient Medications:     atorvastatin (LIPITOR) 40 MG tablet, Take 1 tablet (40 mg total) by mouth once daily., Disp: 90 tablet, Rfl: 3    FLUoxetine 20 MG capsule, TAKE 1 CAPSULE BY MOUTH EVERY DAY, Disp: 30 capsule, Rfl: 1    Allergies    Review of patient's allergies indicates:   Allergen Reactions    Grass pollen- grass standard Other (See Comments)       SocHx    Social History     Tobacco Use   Smoking Status Former    Types: Cigarettes    Start date: 2012    Quit date: 2016    Years since quittin.2   Smokeless Tobacco Never       Social History     Substance and Sexual Activity   Alcohol Use Never       Drug Use - no, rare MJ use  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 Negative for chills, diaphoresis, fever and weight loss.        + EDS   HENT:  Negative for congestion.    Eyes:  Negative for pain.   Respiratory:  Negative for cough, hemoptysis, sputum production, shortness of breath, wheezing and stridor.    Cardiovascular:  Negative for chest pain, palpitations, orthopnea, claudication, leg swelling and PND.   Gastrointestinal:  Negative for abdominal pain, constipation, diarrhea, heartburn, nausea and vomiting.   Genitourinary:  Negative for dysuria, frequency and urgency.   Musculoskeletal:  Negative for falls and myalgias.   Neurological:  Negative for sensory change, focal weakness and weakness.   Psychiatric/Behavioral:  Negative for depression, substance abuse and suicidal ideas. The patient is not nervous/anxious.      Physical Exam    Vitals:    09/27/22 1322   BP: 130/80   BP Location: Left arm   Patient Position: Sitting   BP Method: Medium (Manual)   Pulse: 66   SpO2: 98%   Weight: 100.6 kg (221 lb 12.8 oz)       Physical Exam  Vitals and nursing note reviewed.   Constitutional:       General: He is not in acute distress.     Appearance: He is well-developed. He is not ill-appearing, toxic-appearing or diaphoretic.   HENT:      Head: Normocephalic and atraumatic.      Right Ear: External ear normal.      Left Ear: External ear normal.      Nose: Nose normal.      Mouth/Throat:      Mouth: Mucous membranes are moist.      Pharynx: Oropharynx is clear. No oropharyngeal exudate or posterior oropharyngeal erythema.      Comments: + Mallampatti II  Eyes:      General: No scleral icterus.        Right eye: No discharge.         Left eye: No discharge.      Extraocular Movements: Extraocular movements intact.      Conjunctiva/sclera: Conjunctivae normal.      Pupils: Pupils are equal, round, and reactive to light.   Neck:      Thyroid: No thyromegaly.      Vascular: No JVD.      Trachea: No tracheal deviation.    Cardiovascular:      Rate and Rhythm: Normal rate and regular rhythm.      Heart sounds: Normal heart sounds. No murmur heard.    No friction rub. No gallop.   Pulmonary:      Effort: Pulmonary effort is normal. No respiratory distress.      Breath sounds: Normal breath sounds. No stridor. No wheezing, rhonchi or rales.   Chest:      Chest wall: No tenderness.   Abdominal:      General: Bowel sounds are normal. There is no distension.      Palpations: Abdomen is soft.      Tenderness: There is no abdominal tenderness. There is no guarding.   Musculoskeletal:         General: No tenderness. Normal range of motion.      Cervical back: Normal range of motion and neck supple. No rigidity or tenderness.      Right lower leg: No edema.      Left lower leg: No edema.   Lymphadenopathy:      Cervical: No cervical adenopathy.   Neurological:      General: No focal deficit present.      Mental Status: He is alert and oriented to person, place, and time. Mental status is at baseline.      Cranial Nerves: No cranial nerve deficit.      Motor: No weakness.      Gait: Gait normal.      Deep Tendon Reflexes: Reflexes are normal and symmetric.   Psychiatric:         Mood and Affect: Mood normal.         Behavior: Behavior normal.         Thought Content: Thought content normal.         Judgment: Judgment normal.       Labs    Lab Results   Component Value Date    WBC 7.47 07/15/2022    HGB 15.4 07/15/2022    HCT 45.4 07/15/2022     07/15/2022       Sodium   Date Value Ref Range Status   07/15/2022 136 136 - 145 mmol/L Final     Potassium   Date Value Ref Range Status   07/15/2022 3.8 3.5 - 5.1 mmol/L Final     Chloride   Date Value Ref Range Status   07/15/2022 101 95 - 110 mmol/L Final     CO2   Date Value Ref Range Status   07/15/2022 24 23 - 29 mmol/L Final     Glucose   Date Value Ref Range Status   07/15/2022 104 70 - 110 mg/dL Final     BUN   Date Value Ref Range Status   07/15/2022 12 6 - 20 mg/dL Final      Creatinine   Date Value Ref Range Status   07/15/2022 0.9 0.5 - 1.4 mg/dL Final     Calcium   Date Value Ref Range Status   07/15/2022 9.7 8.7 - 10.5 mg/dL Final     Total Protein   Date Value Ref Range Status   07/15/2022 8.1 6.0 - 8.4 g/dL Final     Albumin   Date Value Ref Range Status   07/15/2022 4.6 3.5 - 5.2 g/dL Final     Total Bilirubin   Date Value Ref Range Status   07/15/2022 0.4 0.1 - 1.0 mg/dL Final     Comment:     For infants and newborns, interpretation of results should be based  on gestational age, weight and in agreement with clinical  observations.    Premature Infant recommended reference ranges:  Up to 24 hours.............<8.0 mg/dL  Up to 48 hours............<12.0 mg/dL  3-5 days..................<15.0 mg/dL  6-29 days.................<15.0 mg/dL       Alkaline Phosphatase   Date Value Ref Range Status   07/15/2022 59 55 - 135 U/L Final     AST   Date Value Ref Range Status   07/15/2022 24 10 - 40 U/L Final     ALT   Date Value Ref Range Status   07/15/2022 41 10 - 44 U/L Final     Anion Gap   Date Value Ref Range Status   07/15/2022 11 8 - 16 mmol/L Final       Xrays        Impression/Plan    Problem List Items Addressed This Visit          ENT    Nasal congestion     Will try flonase and antihistamine  May need further evaluation            Other    Obstructive sleep apnea     Will order home sleep study  D/w pt at length and all questions answered            I have spent about 45 minutes with the patient taking the history and examining the patient.  We have discussed the diagnoses and current plan and all questions have been answered.  We have discussed the follow up plan.  The patient and family (if present) know to contact the office with any questions they may have.        Alvaro Somers MD

## 2022-10-10 ENCOUNTER — PROCEDURE VISIT (OUTPATIENT)
Dept: SLEEP MEDICINE | Facility: HOSPITAL | Age: 37
End: 2022-10-10
Attending: INTERNAL MEDICINE
Payer: MEDICAID

## 2022-10-10 DIAGNOSIS — G47.33 OBSTRUCTIVE SLEEP APNEA: ICD-10-CM

## 2022-10-18 DIAGNOSIS — G47.33 OBSTRUCTIVE SLEEP APNEA: Primary | ICD-10-CM

## 2022-11-03 ENCOUNTER — PROCEDURE VISIT (OUTPATIENT)
Dept: SLEEP MEDICINE | Facility: HOSPITAL | Age: 37
End: 2022-11-03
Attending: INTERNAL MEDICINE
Payer: MEDICAID

## 2022-11-03 DIAGNOSIS — G47.33 OBSTRUCTIVE SLEEP APNEA: ICD-10-CM

## 2022-11-03 PROCEDURE — 95811 POLYSOM 6/>YRS CPAP 4/> PARM: CPT

## 2022-11-08 ENCOUNTER — PATIENT MESSAGE (OUTPATIENT)
Dept: PULMONOLOGY | Facility: HOSPITAL | Age: 37
End: 2022-11-08

## 2022-11-08 DIAGNOSIS — G47.33 OBSTRUCTIVE SLEEP APNEA: Primary | ICD-10-CM

## 2022-11-18 ENCOUNTER — PATIENT MESSAGE (OUTPATIENT)
Dept: PULMONOLOGY | Facility: CLINIC | Age: 37
End: 2022-11-18

## 2022-11-21 ENCOUNTER — TELEPHONE (OUTPATIENT)
Dept: PULMONOLOGY | Facility: CLINIC | Age: 37
End: 2022-11-21

## 2022-11-21 DIAGNOSIS — G47.33 OBSTRUCTIVE SLEEP APNEA: Primary | ICD-10-CM

## 2022-11-21 NOTE — TELEPHONE ENCOUNTER
Spoke with Walt correa) she says that the insurance will not cover his oxygen without a walk test or a cpap titration in lab as well . Please order both so we can try an get him oxygen an cpap

## 2022-11-30 ENCOUNTER — HOSPITAL ENCOUNTER (OUTPATIENT)
Dept: PULMONOLOGY | Facility: HOSPITAL | Age: 37
Discharge: HOME | End: 2022-11-30
Attending: INTERNAL MEDICINE
Payer: MEDICAID

## 2022-11-30 VITALS — WEIGHT: 221 LBS | HEIGHT: 73 IN | BODY MASS INDEX: 29.29 KG/M2

## 2022-11-30 DIAGNOSIS — G47.33 OBSTRUCTIVE SLEEP APNEA: ICD-10-CM

## 2022-11-30 PROCEDURE — 94618 PULMONARY STRESS TESTING: CPT

## 2022-11-30 NOTE — CARE UPDATE
"   11/30/22 1122   6MW Test   Ordering Provider Alvaro Somers MD   Diagnosis Shortness of Breath   Height 6' 1" (1.854 m)   Weight 100.2 kg (221 lb)   BMI (Calculated) 29.2   Predicted Distance Meters (Calculated) 742.35 meters   Patient Race    6MWT Status completed without stopping   Patient Reported No complaints   Was O2 used? No   6MW Distance walked (feet) 1800 feet   Distance walked (meters) 548.64 meters   Did patient stop? No   Type of assistive device(s) used? no assistive devices   Is extra documentation required for this patient? Yes   Pre-Exercise   Oxygen Saturation 97 %   Supplemental Oxygen Room Air   Heart Rate 87 bpm   Brayden Dyspnea Rating  nothing at all   Post Exercise   Oxygen Saturation 98 %   Supplemental Oxygen Room Air   Heart Rate 107 bpm   Brayden Dyspnea Rating  very, very light (just noticeable)   Recovery   Oxygen Saturation 98 %   Supplemental Oxygen Room Air   Heart Rate 99 bpm   Brayden Dyspnea Rating  very, very light (just noticeable)   Interpretation   Is procedure ready for interpretation? Yes   Did the patient stop or pause? No   Total Laps Walked 9   Final Partial Lap Distance (feet) 0 feet   Total Distance Feet (Calculated) 1800 feet   Total Distance Meters (Calculated) 548.64 meters   Percentage of Predicted (Calculated) 73.91   Peak VO2 (Calculated) 20.44   Mets 5.84   Comments This is a Non-Hypoxemic 6 min. walk.   Oxygen Qualification   Oxygen Qualification? No     "

## 2022-12-01 ENCOUNTER — PATIENT MESSAGE (OUTPATIENT)
Dept: PULMONOLOGY | Facility: CLINIC | Age: 37
End: 2022-12-01

## 2023-01-03 ENCOUNTER — PATIENT MESSAGE (OUTPATIENT)
Dept: PULMONOLOGY | Facility: CLINIC | Age: 38
End: 2023-01-03

## 2023-03-22 ENCOUNTER — OFFICE VISIT (OUTPATIENT)
Dept: URGENT CARE | Facility: CLINIC | Age: 38
End: 2023-03-22
Payer: MEDICAID

## 2023-03-22 VITALS
TEMPERATURE: 98 F | BODY MASS INDEX: 30.48 KG/M2 | RESPIRATION RATE: 16 BRPM | OXYGEN SATURATION: 99 % | HEIGHT: 72 IN | HEART RATE: 88 BPM | WEIGHT: 225 LBS | DIASTOLIC BLOOD PRESSURE: 83 MMHG | SYSTOLIC BLOOD PRESSURE: 127 MMHG

## 2023-03-22 DIAGNOSIS — H65.02 ACUTE SEROUS OTITIS MEDIA OF LEFT EAR, RECURRENCE NOT SPECIFIED: ICD-10-CM

## 2023-03-22 DIAGNOSIS — H92.02 ACUTE OTALGIA, LEFT: Primary | ICD-10-CM

## 2023-03-22 PROCEDURE — 99214 PR OFFICE/OUTPT VISIT, EST, LEVL IV, 30-39 MIN: ICD-10-PCS | Mod: S$GLB,,, | Performed by: PHYSICIAN ASSISTANT

## 2023-03-22 PROCEDURE — 99214 OFFICE O/P EST MOD 30 MIN: CPT | Mod: S$GLB,,, | Performed by: PHYSICIAN ASSISTANT

## 2023-03-22 RX ORDER — AMOXICILLIN 500 MG/1
500 TABLET, FILM COATED ORAL EVERY 12 HOURS
Qty: 14 TABLET | Refills: 0 | Status: SHIPPED | OUTPATIENT
Start: 2023-03-22 | End: 2023-03-29

## 2023-03-22 RX ORDER — FLUTICASONE PROPIONATE 50 MCG
1 SPRAY, SUSPENSION (ML) NASAL 2 TIMES DAILY PRN
Qty: 15 G | Refills: 0 | Status: SHIPPED | OUTPATIENT
Start: 2023-03-22

## 2023-03-22 RX ORDER — LEVOCETIRIZINE DIHYDROCHLORIDE 5 MG/1
5 TABLET, FILM COATED ORAL NIGHTLY
Qty: 30 TABLET | Refills: 0 | Status: SHIPPED | OUTPATIENT
Start: 2023-03-22 | End: 2023-04-21

## 2023-03-22 NOTE — PROGRESS NOTES
Subjective:       Patient ID: Tamiko Maher is a 37 y.o. male.    Vitals:  height is 6' (1.829 m) and weight is 102.1 kg (225 lb). His oral temperature is 97.7 °F (36.5 °C). His blood pressure is 127/83 and his pulse is 88. His respiration is 16 and oxygen saturation is 99%.     Chief Complaint: Otalgia    Tamiko Maher is a 37 y.o. male presenting for evaluation of left sided ear pain, persisting for the last couple of days.  No fever, no chills.  No nasal congestion, runny nose or cough.  He has had ear infections in the past.  He has a past medical history of Elevated ALT measurement, Fatty liver, GERD (gastroesophageal reflux disease), Hyperlipidemia, and Sleep apnea.      Otalgia   There is pain in the left ear. This is a new problem. Episode onset: 2 days ago. The problem has been gradually worsening. Pertinent negatives include no abdominal pain, coughing, diarrhea, ear discharge, headaches, neck pain, rash or vomiting. Associated symptoms comments: Tinnitus. He has tried NSAIDs for the symptoms. The treatment provided no relief.     Constitution: Negative for chills and fever.   HENT:  Positive for ear pain. Negative for ear discharge, congestion and sinus pressure.    Neck: Negative for neck pain.   Cardiovascular:  Negative for chest pain and palpitations.   Respiratory:  Negative for cough and shortness of breath.    Gastrointestinal:  Negative for abdominal pain, nausea, vomiting and diarrhea.   Musculoskeletal:  Negative for pain and back pain.   Skin:  Negative for rash.   Neurological:  Negative for dizziness, light-headedness, headaches, numbness and tingling.     Objective:      Physical Exam   Constitutional: He is oriented to person, place, and time. He appears well-developed. He is cooperative.  Non-toxic appearance. He does not appear ill. No distress.   HENT:   Head: Normocephalic and atraumatic.   Ears:   Right Ear: Hearing, tympanic membrane, external ear and ear canal normal.    Left Ear: Hearing, external ear and ear canal normal.      Comments: Mild erythema and bulging noted to left TM.    Nose: Nose normal. No mucosal edema, rhinorrhea, nasal deformity or congestion. No epistaxis. Right sinus exhibits no maxillary sinus tenderness and no frontal sinus tenderness. Left sinus exhibits no maxillary sinus tenderness and no frontal sinus tenderness.   Mouth/Throat: Uvula is midline, oropharynx is clear and moist and mucous membranes are normal. No trismus in the jaw. Normal dentition. No uvula swelling. No oropharyngeal exudate, posterior oropharyngeal edema or posterior oropharyngeal erythema.   Eyes: Conjunctivae and lids are normal.   Neck: Trachea normal and phonation normal. Neck supple. No edema present. No erythema present.   Cardiovascular: Normal rate, regular rhythm, normal heart sounds and normal pulses.   Pulmonary/Chest: Effort normal and breath sounds normal. No respiratory distress. He has no decreased breath sounds. He has no rhonchi.   Abdominal: Normal appearance.   Musculoskeletal: Normal range of motion.         General: No deformity. Normal range of motion.   Neurological: He is alert and oriented to person, place, and time. He exhibits normal muscle tone. Coordination normal.   Skin: Skin is warm, dry, intact, not diaphoretic and not pale.   Psychiatric: His speech is normal and behavior is normal. Judgment and thought content normal.   Nursing note and vitals reviewed.      Assessment:       1. Acute otalgia, left    2. Acute serous otitis media of left ear, recurrence not specified          Plan:         Acute otalgia, left  -     amoxicillin (AMOXIL) 500 MG Tab; Take 1 tablet (500 mg total) by mouth every 12 (twelve) hours. for 7 days  Dispense: 14 tablet; Refill: 0  -     levocetirizine (XYZAL) 5 MG tablet; Take 1 tablet (5 mg total) by mouth every evening.  Dispense: 30 tablet; Refill: 0  -     fluticasone propionate (FLONASE) 50 mcg/actuation nasal spray; 1  spray (50 mcg total) by Each Nostril route 2 (two) times daily as needed for Rhinitis.  Dispense: 15 g; Refill: 0    Acute serous otitis media of left ear, recurrence not specified  -     amoxicillin (AMOXIL) 500 MG Tab; Take 1 tablet (500 mg total) by mouth every 12 (twelve) hours. for 7 days  Dispense: 14 tablet; Refill: 0  -     levocetirizine (XYZAL) 5 MG tablet; Take 1 tablet (5 mg total) by mouth every evening.  Dispense: 30 tablet; Refill: 0  -     fluticasone propionate (FLONASE) 50 mcg/actuation nasal spray; 1 spray (50 mcg total) by Each Nostril route 2 (two) times daily as needed for Rhinitis.  Dispense: 15 g; Refill: 0           Medical Decision Making:   History:   Old Medical Records: I decided to obtain old medical records.  Old Records Summarized: records from clinic visits and records from previous admission(s).  Differential Diagnosis:   Otitis Media   Serous Otitis Media   Otitis Externa   Mastoiditis     Urgent Care Management:  Symptoms consistent with serous otitis media, but will cover with oral antibiotics given his history of previous Otitis Media.  He will be discharged home to follow-up with his PCP for re-evaluation as needed.  He voices understanding and is agreeable to the plan.  He is given specific return precautions.

## 2024-07-09 ENCOUNTER — PATIENT MESSAGE (OUTPATIENT)
Dept: ADMINISTRATIVE | Facility: HOSPITAL | Age: 39
End: 2024-07-09
Payer: MEDICAID

## 2025-05-02 ENCOUNTER — OFFICE VISIT (OUTPATIENT)
Dept: GASTROENTEROLOGY | Facility: CLINIC | Age: 40
End: 2025-05-02
Payer: MEDICAID

## 2025-05-02 VITALS
WEIGHT: 246.13 LBS | BODY MASS INDEX: 33.34 KG/M2 | HEART RATE: 87 BPM | SYSTOLIC BLOOD PRESSURE: 133 MMHG | DIASTOLIC BLOOD PRESSURE: 85 MMHG | HEIGHT: 72 IN

## 2025-05-02 DIAGNOSIS — R19.7 DIARRHEA, UNSPECIFIED TYPE: ICD-10-CM

## 2025-05-02 DIAGNOSIS — K21.9 GASTROESOPHAGEAL REFLUX DISEASE, UNSPECIFIED WHETHER ESOPHAGITIS PRESENT: Primary | ICD-10-CM

## 2025-05-02 DIAGNOSIS — R10.12 LUQ PAIN: ICD-10-CM

## 2025-05-02 PROCEDURE — 99213 OFFICE O/P EST LOW 20 MIN: CPT | Mod: PBBFAC,PN | Performed by: NURSE PRACTITIONER

## 2025-05-02 PROCEDURE — 3075F SYST BP GE 130 - 139MM HG: CPT | Mod: CPTII,,, | Performed by: NURSE PRACTITIONER

## 2025-05-02 PROCEDURE — 3079F DIAST BP 80-89 MM HG: CPT | Mod: CPTII,,, | Performed by: NURSE PRACTITIONER

## 2025-05-02 PROCEDURE — 99999 PR PBB SHADOW E&M-EST. PATIENT-LVL III: CPT | Mod: PBBFAC,,, | Performed by: NURSE PRACTITIONER

## 2025-05-02 PROCEDURE — 3008F BODY MASS INDEX DOCD: CPT | Mod: CPTII,,, | Performed by: NURSE PRACTITIONER

## 2025-05-02 PROCEDURE — 1159F MED LIST DOCD IN RCRD: CPT | Mod: CPTII,,, | Performed by: NURSE PRACTITIONER

## 2025-05-02 PROCEDURE — 99204 OFFICE O/P NEW MOD 45 MIN: CPT | Mod: S$PBB,,, | Performed by: NURSE PRACTITIONER

## 2025-05-02 PROCEDURE — 1160F RVW MEDS BY RX/DR IN RCRD: CPT | Mod: CPTII,,, | Performed by: NURSE PRACTITIONER

## 2025-05-02 RX ORDER — FAMOTIDINE 20 MG/1
20 TABLET, FILM COATED ORAL 2 TIMES DAILY
Qty: 60 TABLET | Refills: 2 | Status: SHIPPED | OUTPATIENT
Start: 2025-05-02 | End: 2026-05-02

## 2025-05-02 NOTE — PATIENT INSTRUCTIONS
Dear Mr. Maher    Attached are your instructions for your upcoming procedure. Please take some time to carefully review them and write down any questions you may have. A nurse will call you 1-2 weeks prior to your procedure to go over the instructions and answer any questions you may have.       EGD Instructions    Date of procedure: Friday, 5/16/2025    Arrival Time: We will call you 1 day prior to your procedure to provide you with an arrival time.    Location of Department:   [facility] [address] [place] [address]   1st Floor Same Day Surgery    The day before your procedure: Thursday, 5/15     You may have a light evening meal.   No solid food after 7:00 pm.   Continue drinking clear liquids.     The day of the procedure: Friday, 5/16    You may have water/clear liquids until 2 hours before your procedure or as directed by the scheduling nurse    Clear Liquids That Are OK to Drink:   Water  Sports drinks (Gatorade, Power-Aid)  Crystal Light, Lemonade, Limeaid  Coffee or tea (no cream or nondairy creamer)  Clear juices without pulp (apple, white grape)  Clear soda (sprite, coke, ginger ale)      What You CANNOT do:   Do not drink milk or any dairy products.  Do not drink alcohol.  No gum chewing or candy morning of procedure.        Comments:         Medications Instructions below:    If you take HEART, BLOOD PRESSURE, SEIZURE, PAIN, LUNG (including inhalers/nebulizers), ANTI-REJECTION (transplant patients), or PSYCHIATRIC medications, please take at your regular times with a sip of water or as directed by the scheduling nurse.    If you begin taking any blood thinning medications, injectable weight loss/diabetes medications (other than insulin), or Adipex(Phentermine) prior to your procedure please contact the endoscopy scheduling department listed below AS SOON AS POSSIBLE. If these medications are not held for the appropriate amount of days prior to procedure,  your procedure will be canceled.      If you have any health changes prior to your procedure, please contact the endoscopy department to report changes.    On occasion, unforeseen circumstances may cause a delay in your procedure start time. We respect your time and appreciate your patience during these circumstances.      Important contact information:    [facility] Endoscopy Department - [phone].  Hours of operation are Monday-Friday 8:00-4:30pm.    If you have questions regarding the prep or you need to reschedule, please call [phone].    After hours questions requiring immediate assistance, contact [facility] nurse line at [phone] or [phone].     Questions about insurance or financial obligations call [phone] or [phone].      Comments:                           IMPORTANT INFORMATION TO KNOW BEFORE YOUR PROCEDURE     [facility]      An adult friend/ family member MUST come with you to drive you home. You can not drive, take a taxi, Uber, Lyft, bus, or any form of public transportation without being accompanied by a  responsible adult. The responsible adult CAN NOT be the  of the service.     person must be available to return to pick you up within 15 minutes of being notified of discharge.    Please bring a picture ID, insurance card, & copayment    LEAVE ALL VALUABLES AND JEWELRY AT HOME. [facility] WILL NOT HOLD OR BE RESPONSIBLE FOR ANY LOST VALUABLES, JEWELRY, OR PERSONAL BELONGINGS. YOUR PERSONAL BELONGINGS MUST BE HELD BY YOUR ACCOMPANYING FRIEND/FAMILY MEMBER.    Plan to be at the hospital for 2-4 hours.           Please follow the instructions below:  1. You will see an American flag flying in the front of the hospital from [address] as close as you can in that lot.  2. Behind the flag, you will see a covered [address], enter through those automatic double doors.  3. Immediately when you  enter, you should be greeted by a team member who can assist you.  4. If not, follow the hallway immediately in front of you to the right towards the cafeteria.  5. Once you reach the cafeteria, there are only two options. You can either enter the cafeteria or continue down the hallway to your left. You will want to continue down the hallway all the way down to your left until you absolutely cannot go anymore. You will then find yourself in our Same Day Surgery lobby where you will check in behind the glass window.      [facility] Map for Endoscopy Procedures:

## 2025-05-07 ENCOUNTER — TELEPHONE (OUTPATIENT)
Dept: GASTROENTEROLOGY | Facility: CLINIC | Age: 40
End: 2025-05-07
Payer: MEDICAID

## 2025-05-07 NOTE — PROGRESS NOTES
Subjective:       Patient ID: Tamiko Maher is a 39 y.o. male.    Chief Complaint: Gastroesophageal Reflux, Abdominal Pain (LUQ), and Diarrhea    38 y/o male presents to clinic with c/o reflux, abdominal pain and diarrhea. Patient was previously seen by GI providers in [place] (RANDALL Joseph NP and Dr. Bautista) in 2016. Patient reports frequent heartburn and regurgitation after meals. No dysphagia or globus sensation. Intermittent LUQ pain not related to food intake or BMs. Reports area is sensitive to light touch and deep palpation causes belching. He has tried eliminating spicy and acidic foods and carbonated beverages with no improvement. Does not recall relief with previously prescribed Zantac or omeprazole. Prefers to avoid PPI as he reports weight gain with medication and concerns of potential long term side effects. EGD in 2016 revealed reflux esophagitis and medium sized hiatal hernia. Chronic diarrhea for years. Colonoscopy in 2016 was normal. Did not complete stool studies ordered by previous provider. Has 3-4 BMs daily with loose, watery stool. Takes Imodium prn with some relief.       Imaging History  -8/25/2016 GES: At 4 hours the percentage of retention is 3% (normal retention at 4 hours is 10% and lower). Clot was approximately 114 minutes which is near the upper limit of normal.     Endoscopy History  - 8/23/2016 EGD: Normal upper third of esophagus and middle third of esophagus. LA Grade C reflux, reflux and erosive esophagitis. Rule out Velasco's esophagus. Biopsied. Medium-sized hiatus hernia. A large amount of food (residue) in the stomach. Normal 2nd part of the duodenum.   - 9/01/2016 Colonoscopy: Non-bleeding internal hemorrhoids. The rectum, sigmoid colon, descending colon, transverse colon, ascending colon, cecum, appendiceal orifice and ileocecal valve are normal. Biopsied. The examined portion of the ileum was normal. Biopsies of the colon were normal.     Past Medical History:    Diagnosis Date    Elevated ALT measurement     Fatty liver     GERD (gastroesophageal reflux disease)     Hyperlipidemia     Sleep apnea        Past Surgical History:   Procedure Laterality Date    COLONOSCOPY  ~    COLONOSCOPY N/A 2016    Procedure: COLONOSCOPY;  Surgeon: Chris Bautista MD;  Location: [facility];  Service: Endoscopy;  Laterality: N/A;    UPPER GASTROINTESTINAL ENDOSCOPY  2010    LA grade c esophagitis, otherwise normal findings; biopsy: mild chronic gastritis, h pylori negative, acute and chronic esophagitits with focal ulcerations, no dysplasia or malignancy    WISDOM TOOTH EXTRACTION         Family History   Problem Relation Name Age of Onset    Colon polyps Mother      Colon cancer Neg Hx      Crohn's disease Neg Hx      Ulcerative colitis Neg Hx         Social History     Socioeconomic History    Marital status: Single   Tobacco Use    Smoking status: Former     Current packs/day: 0.00     Types: Cigarettes     Start date: 2012     Quit date: 2016     Years since quittin.8    Smokeless tobacco: Never   Substance and Sexual Activity    Alcohol use: Never    Drug use: Yes     Types: Marijuana     Comment: very limited    Sexual activity: Not Currently       Review of Systems   Constitutional:  Negative for appetite change and unexpected weight change.   HENT:  Negative for trouble swallowing.    Respiratory:  Negative for shortness of breath.    Cardiovascular:  Negative for chest pain.   Gastrointestinal:  Positive for abdominal pain, diarrhea and nausea. Negative for blood in stool and vomiting.   Hematological:  Negative for adenopathy. Does not bruise/bleed easily.   Psychiatric/Behavioral:  Negative for dysphoric mood.          Objective:     Vitals:    25 1435   BP: 133/85   BP Location: Right arm   Patient Position: Sitting   Pulse: 87   Weight: 111.7 kg (246 lb 2.3 oz)   Height: 6' (1.829 m)          Physical Exam  Constitutional:       General: He is  not in acute distress.     Appearance: Normal appearance.   HENT:      Head: Normocephalic.   Eyes:      Conjunctiva/sclera: Conjunctivae normal.   Pulmonary:      Effort: Pulmonary effort is normal. No respiratory distress.   Abdominal:      General: Bowel sounds are normal. There is no distension.      Palpations: Abdomen is soft.      Tenderness: There is no abdominal tenderness.   Musculoskeletal:         General: Normal range of motion.      Cervical back: Normal range of motion.   Skin:     General: Skin is warm and dry.   Neurological:      Mental Status: He is alert and oriented to person, place, and time.   Psychiatric:         Mood and Affect: Mood normal.         Behavior: Behavior normal.               Assessment:         ICD-10-CM ICD-9-CM   1. Gastroesophageal reflux disease, unspecified whether esophagitis present  K21.9 530.81   2. LUQ pain  R10.12 789.02   3. Diarrhea, unspecified type  R19.7 787.91       Plan:       Gastroesophageal reflux disease, unspecified whether esophagitis present; LUQ pain  -     famotidine (PEPCID) 20 MG tablet; Take 1 tablet (20 mg total) by mouth 2 (two) times daily.  Dispense: 60 tablet; Refill: 2  -     Case Request Endoscopy: EGD (ESOPHAGOGASTRODUODENOSCOPY)    Diarrhea, unspecified type  -     Clostridioides difficile EIA; Future; Expected date: 05/02/2025  -     Giardia / Cryptosporidum, EIA; Future; Expected date: 05/02/2025  -     Pancreatic elastase, fecal; Future; Expected date: 05/02/2025  -     WBC, Stool; Future; Expected date: 05/02/2025  -     Stool Exam-Ova,Cysts,Parasites; Future; Expected date: 05/02/2025  -     Stool culture; Future; Expected date: 05/02/2025  -     Calprotectin, Stool; Future; Expected date: 05/02/2025  -     Fecal fat, qualitative; Future; Expected date: 05/02/2025      Follow up if symptoms worsen or fail to improve.     Patient's Medications   New Prescriptions    FAMOTIDINE (PEPCID) 20 MG TABLET    Take 1 tablet (20 mg total) by  mouth 2 (two) times daily.   Previous Medications    FLUTICASONE PROPIONATE (FLONASE) 50 MCG/ACTUATION NASAL SPRAY    1 spray (50 mcg total) by Each Nostril route 2 (two) times daily as needed for Rhinitis.   Modified Medications    No medications on file   Discontinued Medications    ATORVASTATIN (LIPITOR) 40 MG TABLET    Take 1 tablet (40 mg total) by mouth once daily.    FLUOXETINE 20 MG CAPSULE    TAKE 1 CAPSULE BY MOUTH EVERY DAY    LEVOCETIRIZINE (XYZAL) 5 MG TABLET    Take 1 tablet (5 mg total) by mouth every evening.

## 2025-05-08 NOTE — TELEPHONE ENCOUNTER
Dear Mr. Maher     Attached are your instructions for your upcoming procedure. Please take some time to carefully review them and write down any questions you may have. A nurse will call you 1-2 weeks prior to your procedure to go over the instructions and answer any questions you may have.         EGD Instructions     Date of procedure: Friday, 5/16/2025     Arrival Time: We will call you 1 day prior to your procedure to provide you with an arrival time.     Location of Department:   [facility] [address] [place] [address]   1st Floor Same Day Surgery     The day before your procedure: Thursday, 5/15      You may have a light evening meal.   No solid food after 7:00 pm.   Continue drinking clear liquids.      The day of the procedure: Friday, 5/16     You may have water/clear liquids until 2 hours before your procedure or as directed by the scheduling nurse     Clear Liquids That Are OK to Drink:   Water  Sports drinks (Gatorade, Power-Aid)  Crystal Light, Lemonade, Limeaid  Coffee or tea (no cream or nondairy creamer)  Clear juices without pulp (apple, white grape)  Clear soda (sprite, coke, ginger ale)        What You CANNOT do:   Do not drink milk or any dairy products.  Do not drink alcohol.  No gum chewing or candy morning of procedure.           Comments:            Medications Instructions below:     If you take HEART, BLOOD PRESSURE, SEIZURE, PAIN, LUNG (including inhalers/nebulizers), ANTI-REJECTION (transplant patients), or PSYCHIATRIC medications, please take at your regular times with a sip of water or as directed by the scheduling nurse.     If you begin taking any blood thinning medications, injectable weight loss/diabetes medications (other than insulin), or Adipex(Phentermine) prior to your procedure please contact the endoscopy scheduling department listed below AS SOON AS POSSIBLE. If these medications are not held for the appropriate amount of  days prior to procedure, your procedure will be canceled.        If you have any health changes prior to your procedure, please contact the endoscopy department to report changes.     On occasion, unforeseen circumstances may cause a delay in your procedure start time. We respect your time and appreciate your patience during these circumstances.        Important contact information:     [facility] Endoscopy Department - [phone].  Hours of operation are Monday-Friday 8:00-4:30pm.     If you have questions regarding the prep or you need to reschedule, please call [phone].     After hours questions requiring immediate assistance, contact [facility] nurse line at [phone] or [phone].      Questions about insurance or financial obligations call [phone] or [phone].        Comments:                              IMPORTANT INFORMATION TO KNOW BEFORE YOUR PROCEDURE     [facility]        An adult friend/ family member MUST come with you to drive you home. You can not drive, take a taxi, Uber, Lyft, bus, or any form of public transportation without being accompanied by a  responsible adult. The responsible adult CAN NOT be the  of the service.      person must be available to return to pick you up within 15 minutes of being notified of discharge.     Please bring a picture ID, insurance card, & copayment     LEAVE ALL VALUABLES AND JEWELRY AT HOME. [facility] WILL NOT HOLD OR BE RESPONSIBLE FOR ANY LOST VALUABLES, JEWELRY, OR PERSONAL BELONGINGS. YOUR PERSONAL BELONGINGS MUST BE HELD BY YOUR ACCOMPANYING FRIEND/FAMILY MEMBER.     Plan to be at the hospital for 2-4 hours.              Please follow the instructions below:  1. You will see an American flag flying in the front of the hospital from [address] as close as you can in that lot.  2. Behind the flag, you will see a covered [address], enter through those  automatic double doors.  3. Immediately when you enter, you should be greeted by a team member who can assist you.  4. If not, follow the hallway immediately in front of you to the right towards the cafeteria.  5. Once you reach the cafeteria, there are only two options. You can either enter the cafeteria or continue down the hallway to your left. You will want to continue down the hallway all the way down to your left until you absolutely cannot go anymore. You will then find yourself in our Same Day Surgery lobby where you will check in behind the glass window.        [facility] Map for Endoscopy Procedures:

## 2025-05-12 ENCOUNTER — LAB VISIT (OUTPATIENT)
Dept: LAB | Facility: HOSPITAL | Age: 40
End: 2025-05-12
Attending: NURSE PRACTITIONER
Payer: MEDICAID

## 2025-05-12 DIAGNOSIS — R19.7 DIARRHEA, UNSPECIFIED TYPE: ICD-10-CM

## 2025-05-12 LAB — WBC #/AREA STL HPF: NORMAL /[HPF]

## 2025-05-12 PROCEDURE — 89055 LEUKOCYTE ASSESSMENT FECAL: CPT

## 2025-05-12 PROCEDURE — 87177 OVA AND PARASITES SMEARS: CPT

## 2025-05-12 PROCEDURE — 87045 FECES CULTURE AEROBIC BACT: CPT

## 2025-05-12 PROCEDURE — 82705 FATS/LIPIDS FECES QUAL: CPT

## 2025-05-12 PROCEDURE — 82653 EL-1 FECAL QUANTITATIVE: CPT

## 2025-05-12 PROCEDURE — 83993 ASSAY FOR CALPROTECTIN FECAL: CPT

## 2025-05-14 LAB — BACTERIA STL CULT: NORMAL

## 2025-05-15 LAB
ELASTASE PANC STL-MCNT: >800 UG ELAST./G
FA STL QL: NORMAL
NEUTRAL FAT STL QL: NORMAL

## 2025-07-29 ENCOUNTER — PATIENT MESSAGE (OUTPATIENT)
Dept: GASTROENTEROLOGY | Facility: CLINIC | Age: 40
End: 2025-07-29
Payer: MEDICAID

## 2025-07-30 DIAGNOSIS — K21.00 HIATAL HERNIA WITH GASTROESOPHAGEAL REFLUX DISEASE AND ESOPHAGITIS: Primary | ICD-10-CM

## 2025-07-30 DIAGNOSIS — K44.9 HIATAL HERNIA WITH GASTROESOPHAGEAL REFLUX DISEASE AND ESOPHAGITIS: Primary | ICD-10-CM

## 2025-08-12 ENCOUNTER — OFFICE VISIT (OUTPATIENT)
Dept: SURGERY | Facility: CLINIC | Age: 40
End: 2025-08-12
Payer: MEDICAID

## 2025-08-12 DIAGNOSIS — K44.9 HIATAL HERNIA WITH GASTROESOPHAGEAL REFLUX DISEASE AND ESOPHAGITIS: Primary | ICD-10-CM

## 2025-08-12 DIAGNOSIS — R10.10 UPPER ABDOMINAL PAIN, UNSPECIFIED: ICD-10-CM

## 2025-08-12 DIAGNOSIS — K21.00 HIATAL HERNIA WITH GASTROESOPHAGEAL REFLUX DISEASE AND ESOPHAGITIS: Primary | ICD-10-CM
